# Patient Record
Sex: MALE | Race: WHITE | URBAN - METROPOLITAN AREA
[De-identification: names, ages, dates, MRNs, and addresses within clinical notes are randomized per-mention and may not be internally consistent; named-entity substitution may affect disease eponyms.]

---

## 2022-09-09 ENCOUNTER — TELEPHONE (OUTPATIENT)
Dept: FAMILY MEDICINE CLINIC | Facility: CLINIC | Age: 27
End: 2022-09-09

## 2022-09-09 DIAGNOSIS — H20.9 UVEITIS: Primary | ICD-10-CM

## 2022-09-09 NOTE — TELEPHONE ENCOUNTER
Can patient be referred by Dr. Caridad Johnson to an out of network eye doctor at [facility].  Patient has a serious and specific eye condition, uvitis, glaucoma and other things. He needs a referral to Susan Valladares at [facility]. Please call patient to advise whether this is possible through Dr Caridad Johnson. Call patient back at number on file. asap.

## 2022-09-09 NOTE — TELEPHONE ENCOUNTER
Patient notified that specialist is [facility].   Patient forwarded to Dr. Shaq Lundberg for a recommendation for an in .

## 2022-09-21 ENCOUNTER — OFFICE VISIT (OUTPATIENT)
Dept: FAMILY MEDICINE CLINIC | Facility: CLINIC | Age: 27
End: 2022-09-21

## 2022-09-21 VITALS
TEMPERATURE: 98 F | WEIGHT: 144 LBS | BODY MASS INDEX: 21.33 KG/M2 | RESPIRATION RATE: 18 BRPM | SYSTOLIC BLOOD PRESSURE: 91 MMHG | HEIGHT: 69 IN | DIASTOLIC BLOOD PRESSURE: 57 MMHG | HEART RATE: 69 BPM

## 2022-09-21 DIAGNOSIS — H20.9 UVEITIS: Primary | ICD-10-CM

## 2022-09-21 DIAGNOSIS — Z86.69 HISTORY OF GLAUCOMA: ICD-10-CM

## 2022-09-21 PROCEDURE — 3074F SYST BP LT 130 MM HG: CPT | Performed by: FAMILY MEDICINE

## 2022-09-21 PROCEDURE — 99202 OFFICE O/P NEW SF 15 MIN: CPT | Performed by: FAMILY MEDICINE

## 2022-09-21 PROCEDURE — 3008F BODY MASS INDEX DOCD: CPT | Performed by: FAMILY MEDICINE

## 2022-09-21 PROCEDURE — 3078F DIAST BP <80 MM HG: CPT | Performed by: FAMILY MEDICINE

## 2022-09-21 RX ORDER — METHOTREXATE 2.5 MG/1
TABLET ORAL
COMMUNITY
Start: 2022-09-08 | End: 2022-09-21

## 2022-09-21 RX ORDER — DIFLUPREDNATE 0.5 MG/ML
EMULSION OPHTHALMIC
COMMUNITY
Start: 2022-05-01

## 2022-09-21 RX ORDER — BRIMONIDINE TARTRATE 2 MG/ML
1 SOLUTION/ DROPS OPHTHALMIC 2 TIMES DAILY
COMMUNITY
Start: 2022-04-04

## 2022-09-21 RX ORDER — DIFLUPREDNATE 0.5 MG/ML
EMULSION OPHTHALMIC
COMMUNITY
Start: 2022-08-01

## 2022-09-21 RX ORDER — PREDNISONE 10 MG/1
TABLET ORAL
COMMUNITY
Start: 2022-04-15

## 2022-09-21 RX ORDER — BRIMONIDINE TARTRATE 2 MG/ML
1 SOLUTION/ DROPS OPHTHALMIC 2 TIMES DAILY
COMMUNITY
Start: 2022-03-06

## 2022-09-21 RX ORDER — DORZOLAMIDE HCL 20 MG/ML
SOLUTION/ DROPS OPHTHALMIC
COMMUNITY
Start: 2021-10-10

## 2022-09-21 RX ORDER — ATROPINE SULFATE 10 MG/ML
1 SOLUTION/ DROPS OPHTHALMIC 2 TIMES DAILY
COMMUNITY
Start: 2022-06-08

## 2022-09-21 RX ORDER — FOLIC ACID 1 MG/1
1 TABLET ORAL DAILY
COMMUNITY
Start: 2022-08-11

## 2022-09-21 RX ORDER — ATROPINE SULFATE 10 MG/ML
1 SOLUTION/ DROPS OPHTHALMIC 2 TIMES DAILY
COMMUNITY
Start: 2022-04-27

## 2022-09-21 RX ORDER — NETARSUDIL 0.2 MG/ML
1 SOLUTION/ DROPS OPHTHALMIC; TOPICAL
COMMUNITY
Start: 2022-04-13

## 2022-09-21 RX ORDER — LATANOPROST 50 UG/ML
SOLUTION/ DROPS OPHTHALMIC
COMMUNITY
Start: 2021-12-02

## 2022-09-21 RX ORDER — NETARSUDIL 0.2 MG/ML
1 SOLUTION/ DROPS OPHTHALMIC; TOPICAL
COMMUNITY
Start: 2022-03-15 | End: 2022-09-21

## 2022-11-04 ENCOUNTER — TELEPHONE (OUTPATIENT)
Dept: CASE MANAGEMENT | Age: 27
End: 2022-11-04

## 2022-11-04 DIAGNOSIS — H20.9 UVEITIS: Primary | ICD-10-CM

## 2022-11-04 DIAGNOSIS — Z86.69 HISTORY OF GLAUCOMA: ICD-10-CM

## 2022-11-04 NOTE — TELEPHONE ENCOUNTER
Dr. Wanda Méndez,    The patient is requesting a referral to Dr Thong Ahn for appointment 11/11/22. Pended referral please review diagnosis and sign off if you agree. Thank you.   Daniel Clinton

## 2022-11-05 NOTE — TELEPHONE ENCOUNTER
Referral request noted. Referral approved, signed and sent to [facility].